# Patient Record
Sex: FEMALE | Race: WHITE | URBAN - METROPOLITAN AREA
[De-identification: names, ages, dates, MRNs, and addresses within clinical notes are randomized per-mention and may not be internally consistent; named-entity substitution may affect disease eponyms.]

---

## 2023-01-01 ENCOUNTER — NURSING HOME VISIT (OUTPATIENT)
Dept: POST ACUTE CARE | Facility: EXTERNAL LOCATION | Age: 88
End: 2023-01-01
Payer: MEDICARE

## 2023-01-01 VITALS
WEIGHT: 287 LBS | DIASTOLIC BLOOD PRESSURE: 50 MMHG | HEART RATE: 85 BPM | TEMPERATURE: 96.9 F | OXYGEN SATURATION: 98 % | BODY MASS INDEX: 54.19 KG/M2 | RESPIRATION RATE: 18 BRPM | SYSTOLIC BLOOD PRESSURE: 136 MMHG | HEIGHT: 61 IN

## 2023-01-01 VITALS
WEIGHT: 287.6 LBS | HEART RATE: 86 BPM | SYSTOLIC BLOOD PRESSURE: 131 MMHG | BODY MASS INDEX: 54.3 KG/M2 | TEMPERATURE: 97.5 F | OXYGEN SATURATION: 96 % | DIASTOLIC BLOOD PRESSURE: 58 MMHG | HEIGHT: 61 IN | RESPIRATION RATE: 16 BRPM

## 2023-01-01 VITALS
SYSTOLIC BLOOD PRESSURE: 138 MMHG | RESPIRATION RATE: 18 BRPM | DIASTOLIC BLOOD PRESSURE: 55 MMHG | TEMPERATURE: 97.3 F | OXYGEN SATURATION: 95 % | WEIGHT: 288 LBS | HEIGHT: 61 IN | BODY MASS INDEX: 54.37 KG/M2 | HEART RATE: 79 BPM

## 2023-01-01 VITALS
HEIGHT: 61 IN | WEIGHT: 287 LBS | RESPIRATION RATE: 16 BRPM | TEMPERATURE: 97.1 F | OXYGEN SATURATION: 96 % | SYSTOLIC BLOOD PRESSURE: 154 MMHG | DIASTOLIC BLOOD PRESSURE: 69 MMHG | BODY MASS INDEX: 54.19 KG/M2 | HEART RATE: 72 BPM

## 2023-01-01 DIAGNOSIS — Z74.09 IMPAIRED FUNCTIONAL MOBILITY, BALANCE, GAIT, AND ENDURANCE: ICD-10-CM

## 2023-01-01 DIAGNOSIS — N32.81 OVERACTIVE BLADDER: ICD-10-CM

## 2023-01-01 DIAGNOSIS — M19.90 OSTEOARTHRITIS, CHRONIC: ICD-10-CM

## 2023-01-01 DIAGNOSIS — F32.A ANXIETY AND DEPRESSION: ICD-10-CM

## 2023-01-01 DIAGNOSIS — E78.5 HYPERLIPIDEMIA, UNSPECIFIED HYPERLIPIDEMIA TYPE: ICD-10-CM

## 2023-01-01 DIAGNOSIS — R60.0 EDEMA OF BOTH LOWER LEGS: ICD-10-CM

## 2023-01-01 DIAGNOSIS — Z74.09 IMPAIRED FUNCTIONAL MOBILITY, BALANCE, GAIT, AND ENDURANCE: Primary | ICD-10-CM

## 2023-01-01 DIAGNOSIS — I48.11 LONGSTANDING PERSISTENT ATRIAL FIBRILLATION (MULTI): ICD-10-CM

## 2023-01-01 DIAGNOSIS — I48.91 ATRIAL FIBRILLATION, UNSPECIFIED TYPE (MULTI): ICD-10-CM

## 2023-01-01 DIAGNOSIS — R60.0 EDEMA OF BOTH LOWER LEGS: Primary | ICD-10-CM

## 2023-01-01 DIAGNOSIS — N17.9 AKI (ACUTE KIDNEY INJURY) (CMS-HCC): Primary | ICD-10-CM

## 2023-01-01 DIAGNOSIS — Z79.4 TYPE 2 DIABETES MELLITUS WITH OTHER CIRCULATORY COMPLICATION, WITH LONG-TERM CURRENT USE OF INSULIN (MULTI): ICD-10-CM

## 2023-01-01 DIAGNOSIS — E11.59 TYPE 2 DIABETES MELLITUS WITH OTHER CIRCULATORY COMPLICATION, WITH LONG-TERM CURRENT USE OF INSULIN (MULTI): ICD-10-CM

## 2023-01-01 DIAGNOSIS — Z79.01 CURRENT USE OF ANTICOAGULANT THERAPY: ICD-10-CM

## 2023-01-01 DIAGNOSIS — F41.9 ANXIETY AND DEPRESSION: ICD-10-CM

## 2023-01-01 DIAGNOSIS — F41.9 ANXIETY: Primary | ICD-10-CM

## 2023-01-01 DIAGNOSIS — K21.9 GASTROESOPHAGEAL REFLUX DISEASE, UNSPECIFIED WHETHER ESOPHAGITIS PRESENT: ICD-10-CM

## 2023-01-01 DIAGNOSIS — G25.81 RESTLESS LEG SYNDROME: ICD-10-CM

## 2023-01-01 DIAGNOSIS — R63.0 POOR APPETITE: ICD-10-CM

## 2023-01-01 LAB
ALANINE AMINOTRANSFERASE (SGPT) (U/L) IN SER/PLAS: 41 U/L (ref 7–45)
ALBUMIN (G/DL) IN SER/PLAS: 2.4 G/DL (ref 3.4–5)
ALKALINE PHOSPHATASE (U/L) IN SER/PLAS: 109 U/L (ref 33–136)
ANION GAP IN SER/PLAS: 14 MMOL/L (ref 10–20)
ANION GAP IN SER/PLAS: 20 MMOL/L (ref 10–20)
ANION GAP IN SER/PLAS: 22 MMOL/L (ref 10–20)
ASPARTATE AMINOTRANSFERASE (SGOT) (U/L) IN SER/PLAS: 63 U/L (ref 9–39)
BASOPHILS (10*3/UL) IN BLOOD BY AUTOMATED COUNT: 0.03 X10E9/L (ref 0–0.1)
BASOPHILS/100 LEUKOCYTES IN BLOOD BY AUTOMATED COUNT: 0.3 % (ref 0–2)
BILIRUBIN TOTAL (MG/DL) IN SER/PLAS: 0.7 MG/DL (ref 0–1.2)
BLOOD CULTURE: ABNORMAL
BLOOD CULTURE: ABNORMAL
CALCIUM (MG/DL) IN SER/PLAS: 8 MG/DL (ref 8.6–10.3)
CALCIUM (MG/DL) IN SER/PLAS: 8.6 MG/DL (ref 8.6–10.3)
CALCIUM (MG/DL) IN SER/PLAS: 8.8 MG/DL (ref 8.6–10.3)
CARBON DIOXIDE, TOTAL (MMOL/L) IN SER/PLAS: 18 MMOL/L (ref 21–32)
CARBON DIOXIDE, TOTAL (MMOL/L) IN SER/PLAS: 20 MMOL/L (ref 21–32)
CARBON DIOXIDE, TOTAL (MMOL/L) IN SER/PLAS: 26 MMOL/L (ref 21–32)
CHLORIDE (MMOL/L) IN SER/PLAS: 92 MMOL/L (ref 98–107)
CHLORIDE (MMOL/L) IN SER/PLAS: 96 MMOL/L (ref 98–107)
CHLORIDE (MMOL/L) IN SER/PLAS: 99 MMOL/L (ref 98–107)
CREATININE (MG/DL) IN SER/PLAS: 1.85 MG/DL (ref 0.5–1.05)
CREATININE (MG/DL) IN SER/PLAS: 2.92 MG/DL (ref 0.5–1.05)
CREATININE (MG/DL) IN SER/PLAS: 3.55 MG/DL (ref 0.5–1.05)
EOSINOPHILS (10*3/UL) IN BLOOD BY AUTOMATED COUNT: 0.05 X10E9/L (ref 0–0.4)
EOSINOPHILS/100 LEUKOCYTES IN BLOOD BY AUTOMATED COUNT: 0.5 % (ref 0–6)
ERYTHROCYTE DISTRIBUTION WIDTH (RATIO) BY AUTOMATED COUNT: 15.2 % (ref 11.5–14.5)
ERYTHROCYTE DISTRIBUTION WIDTH (RATIO) BY AUTOMATED COUNT: 17 % (ref 11.5–14.5)
ERYTHROCYTE MEAN CORPUSCULAR HEMOGLOBIN CONCENTRATION (G/DL) BY AUTOMATED: 29.8 G/DL (ref 32–36)
ERYTHROCYTE MEAN CORPUSCULAR HEMOGLOBIN CONCENTRATION (G/DL) BY AUTOMATED: 30.8 G/DL (ref 32–36)
ERYTHROCYTE MEAN CORPUSCULAR VOLUME (FL) BY AUTOMATED COUNT: 87 FL (ref 80–100)
ERYTHROCYTE MEAN CORPUSCULAR VOLUME (FL) BY AUTOMATED COUNT: 89 FL (ref 80–100)
ERYTHROCYTES (10*6/UL) IN BLOOD BY AUTOMATED COUNT: 4.2 X10E12/L (ref 4–5.2)
ERYTHROCYTES (10*6/UL) IN BLOOD BY AUTOMATED COUNT: 4.6 X10E12/L (ref 4–5.2)
GFR FEMALE: 11 ML/MIN/1.73M2
GFR FEMALE: 14 ML/MIN/1.73M2
GFR FEMALE: 25 ML/MIN/1.73M2
GLUCOSE (MG/DL) IN SER/PLAS: 196 MG/DL (ref 74–99)
GLUCOSE (MG/DL) IN SER/PLAS: 215 MG/DL (ref 74–99)
GLUCOSE (MG/DL) IN SER/PLAS: 316 MG/DL (ref 74–99)
HEMATOCRIT (%) IN BLOOD BY AUTOMATED COUNT: 36.7 % (ref 36–46)
HEMATOCRIT (%) IN BLOOD BY AUTOMATED COUNT: 40.9 % (ref 36–46)
HEMOGLOBIN (G/DL) IN BLOOD: 11.3 G/DL (ref 12–16)
HEMOGLOBIN (G/DL) IN BLOOD: 12.2 G/DL (ref 12–16)
IMMATURE GRANULOCYTES/100 LEUKOCYTES IN BLOOD BY AUTOMATED COUNT: 1.2 % (ref 0–0.9)
IMMATURE GRANULOCYTES/100 LEUKOCYTES IN BLOOD BY AUTOMATED COUNT: 1.7 % (ref 0–0.9)
LACTATE (MMOL/L) IN SER/PLAS: 2.5 MMOL/L (ref 0.4–2)
LEUKOCYTES (10*3/UL) IN BLOOD BY AUTOMATED COUNT: 11 X10E9/L (ref 4.4–11.3)
LEUKOCYTES (10*3/UL) IN BLOOD BY AUTOMATED COUNT: 15.7 X10E9/L (ref 4.4–11.3)
LYMPHOCYTES (10*3/UL) IN BLOOD BY AUTOMATED COUNT: 0.81 X10E9/L (ref 0.8–3)
LYMPHOCYTES/100 LEUKOCYTES IN BLOOD BY AUTOMATED COUNT: 7.4 % (ref 13–44)
MANUAL DIFFERENTIAL Y/N: ABNORMAL
MONOCYTES (10*3/UL) IN BLOOD BY AUTOMATED COUNT: 0.83 X10E9/L (ref 0.05–0.8)
MONOCYTES/100 LEUKOCYTES IN BLOOD BY AUTOMATED COUNT: 7.5 % (ref 2–10)
NEUTROPHILS (10*3/UL) IN BLOOD BY AUTOMATED COUNT: 9.16 X10E9/L (ref 1.6–5.5)
NEUTROPHILS/100 LEUKOCYTES IN BLOOD BY AUTOMATED COUNT: 83.1 % (ref 40–80)
NRBC (PER 100 WBCS) BY AUTOMATED COUNT: 0 /100 WBC (ref 0–0)
NRBC (PER 100 WBCS) BY AUTOMATED COUNT: 0.3 /100 WBC (ref 0–0)
PLATELETS (10*3/UL) IN BLOOD AUTOMATED COUNT: 159 X10E9/L (ref 150–450)
PLATELETS (10*3/UL) IN BLOOD AUTOMATED COUNT: 209 X10E9/L (ref 150–450)
POTASSIUM (MMOL/L) IN SER/PLAS: 3.7 MMOL/L (ref 3.5–5.3)
POTASSIUM (MMOL/L) IN SER/PLAS: 4.7 MMOL/L (ref 3.5–5.3)
POTASSIUM (MMOL/L) IN SER/PLAS: 4.8 MMOL/L (ref 3.5–5.3)
PROTEIN TOTAL: 5 G/DL (ref 6.4–8.2)
SODIUM (MMOL/L) IN SER/PLAS: 129 MMOL/L (ref 136–145)
SODIUM (MMOL/L) IN SER/PLAS: 129 MMOL/L (ref 136–145)
SODIUM (MMOL/L) IN SER/PLAS: 135 MMOL/L (ref 136–145)
UREA NITROGEN (MG/DL) IN SER/PLAS: 50 MG/DL (ref 6–23)
UREA NITROGEN (MG/DL) IN SER/PLAS: 69 MG/DL (ref 6–23)
UREA NITROGEN (MG/DL) IN SER/PLAS: 88 MG/DL (ref 6–23)

## 2023-01-01 PROCEDURE — 99309 SBSQ NF CARE MODERATE MDM 30: CPT | Performed by: PHYSICIAN ASSISTANT

## 2023-01-01 PROCEDURE — 99309 SBSQ NF CARE MODERATE MDM 30: CPT | Performed by: NURSE PRACTITIONER

## 2023-01-01 PROCEDURE — 99310 SBSQ NF CARE HIGH MDM 45: CPT | Performed by: PHYSICIAN ASSISTANT

## 2023-01-01 RX ORDER — ATORVASTATIN CALCIUM 20 MG/1
1 TABLET, FILM COATED ORAL DAILY
COMMUNITY
Start: 2019-09-20

## 2023-01-01 RX ORDER — OMEPRAZOLE 20 MG/1
20 CAPSULE, DELAYED RELEASE ORAL
COMMUNITY
Start: 2019-09-20

## 2023-01-01 RX ORDER — ACETAMINOPHEN 325 MG/1
2 TABLET ORAL EVERY 6 HOURS PRN
COMMUNITY
Start: 2020-12-04

## 2023-01-01 RX ORDER — MONTELUKAST SODIUM 10 MG/1
1 TABLET ORAL DAILY
COMMUNITY
Start: 2019-09-18

## 2023-01-01 RX ORDER — METOPROLOL TARTRATE 25 MG/1
1 TABLET, FILM COATED ORAL 2 TIMES DAILY
COMMUNITY
Start: 2020-12-04

## 2023-01-01 RX ORDER — ROPINIROLE 2 MG/1
1 TABLET, FILM COATED ORAL NIGHTLY
COMMUNITY
Start: 2022-04-11

## 2023-01-01 RX ORDER — ASCORBIC ACID 500 MG
1 TABLET ORAL DAILY
COMMUNITY
Start: 2020-01-11

## 2023-01-01 RX ORDER — ADHESIVE BANDAGE
30 BANDAGE TOPICAL DAILY PRN
COMMUNITY
Start: 2019-09-18

## 2023-01-01 RX ORDER — METHENAMINE HIPPURATE 1000 MG/1
1 TABLET ORAL 2 TIMES DAILY
COMMUNITY
Start: 2019-09-20

## 2023-01-01 RX ORDER — EPINEPHRINE 0.3 MG/.3ML
1 INJECTION SUBCUTANEOUS AS NEEDED
COMMUNITY
Start: 2020-10-12

## 2023-01-01 RX ORDER — LINAGLIPTIN 5 MG/1
1 TABLET, FILM COATED ORAL DAILY
COMMUNITY
Start: 2019-09-20

## 2023-01-01 RX ORDER — CALCIUM CARBONATE/VITAMIN D3 600MG-5MCG
1 TABLET ORAL 2 TIMES DAILY
COMMUNITY
Start: 2020-02-18

## 2023-01-01 RX ORDER — MIRABEGRON 25 MG/1
25 TABLET, FILM COATED, EXTENDED RELEASE ORAL DAILY
COMMUNITY
Start: 2019-09-18

## 2023-01-01 RX ORDER — INSULIN GLARGINE 100 [IU]/ML
15 INJECTION, SOLUTION SUBCUTANEOUS NIGHTLY
COMMUNITY
Start: 2019-09-20

## 2023-01-01 RX ORDER — DEXTROSE 40 %
15 GEL (GRAM) ORAL DAILY PRN
COMMUNITY
Start: 2019-09-18

## 2023-01-01 RX ORDER — FUROSEMIDE 40 MG/1
1 TABLET ORAL 2 TIMES DAILY
COMMUNITY
Start: 2019-09-20

## 2023-01-01 RX ORDER — DEXTROMETHORPHAN HYDROBROMIDE, GUAIFENESIN 5; 100 MG/5ML; MG/5ML
650 LIQUID ORAL NIGHTLY
COMMUNITY
Start: 2019-09-18

## 2023-01-01 RX ORDER — NYSTATIN 100000 [USP'U]/G
1 POWDER TOPICAL 2 TIMES DAILY PRN
COMMUNITY
Start: 2019-12-21

## 2023-01-01 ASSESSMENT — ENCOUNTER SYMPTOMS
NERVOUS/ANXIOUS: 1
JOINT SWELLING: 0
EXTREMITY WEAKNESS: 1
CHILLS: 0
CONFUSION: 0
FEELING OF CHOKING: 0
MUSCLE TENSION: 0
FEVER: 0
DEPRESSED MOOD: 1
SHORTNESS OF BREATH: 0
IRRITABILITY: 0
FATIGUE: 1

## 2023-03-10 PROBLEM — N32.81 OVERACTIVE BLADDER: Status: ACTIVE | Noted: 2023-01-01

## 2023-03-10 PROBLEM — G25.81 RESTLESS LEG SYNDROME: Status: ACTIVE | Noted: 2023-01-01

## 2023-03-10 PROBLEM — I10 HYPERTENSION: Status: ACTIVE | Noted: 2023-01-01

## 2023-03-10 PROBLEM — R60.0 EDEMA OF BOTH LOWER LEGS: Status: ACTIVE | Noted: 2023-01-01

## 2023-03-10 PROBLEM — K21.9 GERD (GASTROESOPHAGEAL REFLUX DISEASE): Status: ACTIVE | Noted: 2023-01-01

## 2023-03-10 PROBLEM — E78.5 HYPERLIPIDEMIA: Status: ACTIVE | Noted: 2023-01-01

## 2023-03-10 PROBLEM — I48.91 ATRIAL FIBRILLATION (MULTI): Status: ACTIVE | Noted: 2023-01-01

## 2023-03-10 PROBLEM — E11.9 DIABETES MELLITUS (MULTI): Status: ACTIVE | Noted: 2023-01-01

## 2023-03-10 PROBLEM — J44.9 COPD (CHRONIC OBSTRUCTIVE PULMONARY DISEASE) (MULTI): Status: ACTIVE | Noted: 2023-01-01

## 2023-03-10 PROBLEM — M19.90 OSTEOARTHRITIS, CHRONIC: Status: ACTIVE | Noted: 2023-01-01

## 2023-03-10 PROBLEM — I73.9 PVD (PERIPHERAL VASCULAR DISEASE) (CMS-HCC): Status: ACTIVE | Noted: 2023-01-01

## 2023-03-10 NOTE — ASSESSMENT & PLAN NOTE
-patient continues to refuse ace wrap and emil hose  -encourage resident to elevate legs  -continue lasix bid

## 2023-03-10 NOTE — ASSESSMENT & PLAN NOTE
-continue basaglar at bedtime as ordered  -Continue tradjenta daily  -Continue sliding scale as ordered prn  -Blood sugars at baseline with occasional elevations, but no consistent pattern  -Monitor HGA1C level every 6 months

## 2023-03-10 NOTE — PROGRESS NOTES
Patient was arrived in error. Please go to pradeep             function.  CALCIUM 9.0 8.4-10.2 mg/dL  CBC W/O DIFF STAR  WBC 7.6 4.5-10.8 K/cmm  RBC 4.10 3.90-5.40 M/cmm  ~ HEMOGLOBIN 11.4 L 12.0-16.0 g/dL  ~ HEMATOCRIT 35.7 L 36.0-48.0 %  MCV 87.1 80.0-100.0 fL  MCH 27.8 26.0-35.0 pg  MCHC 31.9 31.0-36.5 g/dL  ~ RDW 16.9 H 11.0-16.0 %  PLATELET 198 150-450 K/cmm  MPV 9.4 6.5-12.0 fL        Assessment/Plan   Problem List Items Addressed This Visit          Nervous    Restless leg syndrome     -continue ropinerole at bedtime  -monitor for increased s/s            Digestive    GERD (gastroesophageal reflux disease)     -continue omeprazole daily  -Monitor for increased s/s            Musculoskeletal    Osteoarthritis, chronic     -tylenol prn         Edema of both lower legs - Primary     -patient continues to refuse ace wrap and emil hose  -encourage resident to elevate legs  -continue lasix bid            Endocrine/Metabolic    Diabetes mellitus (CMS/HCC)     -continue basaglar at bedtime as ordered  -Continue tradjenta daily  -Continue sliding scale as ordered prn  -Blood sugars at baseline with occasional elevations, but no consistent pattern  -Monitor HGA1C level every 6 months            Other    Hyperlipidemia     -continue atorvastatin every day  -monitor lipid profile and lft q6months

## 2023-03-10 NOTE — LETTER
"Subjective   Patient ID: Katya Mejia is a 89 y.o. female who presents for follow up of bilatral lower leg edema.  .89 year old female LTC Resident with hx of COPD, DM2, Afib, PVD and HTN. Per Nursing, Resident is stable and continues to walk better per nursing. Resident sitting up in her chair,, watching tv after lunch. Nursing reports resident has been c/o increased restlessness to both legs especially worse at night. Taking tylenol but reports \"it doesn't help.\" Resident will often refuse to allow the nurses to wrap her legs with the ace wraps. Sitting in her recliner with legs partially elevated. Encouraged her to elevate legs as high as she can tolerate to promote vascular return. . See ROS below. Full code.     Resident seen for follow up of bilateral lower extremity edema. Sitting in her recliner with her legs down, encourged her, once again to lift her legs while up in her chair. Continues to refuse ace wraps or emil hose to her legs. Denies all other concerns.           Review of Systems   Constitutional: Negative.  Negative for appetite change and fatigue.   HENT: Negative.  Negative for sinus pain and sore throat.    Eyes: Negative.    Respiratory: Negative.  Negative for apnea, cough, chest tightness and shortness of breath.    Cardiovascular:  Positive for leg swelling. Negative for chest pain.   Gastrointestinal:  Negative for abdominal distention and constipation.   Endocrine: Negative.    Genitourinary: Negative.  Negative for difficulty urinating and dysuria.   Musculoskeletal: Negative.    Skin: Negative.    Hematological: Negative.    Psychiatric/Behavioral:  Negative for confusion. The patient is not nervous/anxious.    All other systems reviewed and are negative.              Objective   97.9 P-76 R19 /72 wt::288.4lbs po2:97% RA  Physical Exam  Vitals and nursing note reviewed.   Constitutional:       Appearance: Normal appearance. She is normal weight.   HENT:      Head: Normocephalic. "      Nose: Nose normal.      Mouth/Throat:      Mouth: Mucous membranes are dry.   Eyes:      Extraocular Movements: Extraocular movements intact.      Pupils: Pupils are equal, round, and reactive to light.   Cardiovascular:      Rate and Rhythm: Normal rate and regular rhythm.      Pulses: Normal pulses.      Heart sounds: Normal heart sounds.   Pulmonary:      Effort: Pulmonary effort is normal.      Breath sounds: Normal breath sounds.   Abdominal:      General: Abdomen is flat. Bowel sounds are normal.      Palpations: Abdomen is soft.   Musculoskeletal:         General: Normal range of motion.      Cervical back: Normal range of motion and neck supple.   Skin:     General: Skin is warm and dry.      Capillary Refill: Capillary refill takes less than 2 seconds.   Neurological:      General: No focal deficit present.      Mental Status: She is alert and oriented to person, place, and time. Mental status is at baseline.   Psychiatric:         Mood and Affect: Mood normal.         Behavior: Behavior normal.         Thought Content: Thought content normal.         Judgment: Judgment normal.      LABS:  3/8/23:  BASIC MET PNL INCL GFR (BMP) STAR  ~ GLUCOSE 139 H  GLUCOSE, FASTING 65-99 mg/dL  GLUCOSE, NON-FASTING  mg/dL  SODIUM 143 136-145 mEq/L  POTASSIUM 3.9 3.5-5.3 mEq/L  CHLORIDE 102  mEq/L  CARBON DIOXIDE (CO2) 27 21-33 mEq/L  ~ BUN (UREA NITROGEN) 45 H 7-25 mg/dL  ~ CREATININE 1.5 H 0.6-1.2 mg/dL  ~ BUN/CREATININE RATIO 30 H 6-22  ~ GFR-AFRICAN AMERICAN 39 L >60 mL/min/1.73 m2  ~ GFR-NON-AFRICAN AMERICAN 32 L >60 mL/min/1.73 m2   Stage of CKD eGFR (mL/min/1.73 square meters)   Stage 1 >/= 90 or > 90   Stage 2 60 - 89   Stage 3 30 - 59   Stage 4 15 - 29   Stage 5 </= 14 or < 15  ** GFR is reliable for adults 17 to 69 years with stable kidney   function.  CALCIUM 9.0 8.4-10.2 mg/dL  CBC W/O DIFF STAR  WBC 7.6 4.5-10.8 K/cmm  RBC 4.10 3.90-5.40 M/cmm  ~ HEMOGLOBIN 11.4 L 12.0-16.0 g/dL  ~ HEMATOCRIT  35.7 L 36.0-48.0 %  MCV 87.1 80.0-100.0 fL  MCH 27.8 26.0-35.0 pg  MCHC 31.9 31.0-36.5 g/dL  ~ RDW 16.9 H 11.0-16.0 %  PLATELET 198 150-450 K/cmm  MPV 9.4 6.5-12.0 fL                 Assessment/Plan   Problem List Items Addressed This Visit                  Nervous     Restless leg syndrome       -continue ropinerole at bedtime  -monitor for increased s/s                Digestive     GERD (gastroesophageal reflux disease)       -continue omeprazole daily  -Monitor for increased s/s                Musculoskeletal     Osteoarthritis, chronic       -tylenol prn           Edema of both lower legs - Primary       -patient continues to refuse ace wrap and emil hose  -encourage resident to elevate legs  -continue lasix bid                Endocrine/Metabolic     Diabetes mellitus (CMS/HCC)       -continue basaglar at bedtime as ordered  -Continue tradjenta daily  -Continue sliding scale as ordered prn  -Blood sugars at baseline with occasional elevations, but no consistent pattern  -Monitor HGA1C level every 6 months                Other     Hyperlipidemia       -continue atorvastatin every day  -monitor lipid profile and lft q6months

## 2023-05-31 NOTE — PROGRESS NOTES
"5/30/2023  Name: Katya Mejia  YOB: 1929    Chief complaint: Impaired mobility. Dependent on nursing staff for ADL's.    HPI: This is a 89 year old  female who has a medical history remarkable for venous insufficiency, gerd, overactive bladder, diabetes, stage 3 CKD, atrial fibrillation, long term use of anticoagulation, hypertension, restless leg syndrome, AGUSTO. She is a long term resident at [facility]. Patient recently treated for UTI. She is able to stand with assistance but is not able to walk. Review of chart shows good blood sugar control on current regimen. Seen and examined in her room. Patient offers no new complaint.s    Extremity Weakness  The current episode started more than 1 year ago. The problem occurs daily. The problem has been unchanged. Associated symptoms include fatigue. Pertinent negatives include no chest pain, chills, fever or joint swelling. The symptoms are aggravated by standing and exertion. She has tried rest, position changes and acetaminophen for the symptoms. The treatment provided mild relief.     Review of systems:   ROS negative except were noted in HPI.    Code Status: DNR-CCA    /55   Pulse 79   Temp 36.3 °C (97.3 °F)   Resp 18   Ht 1.549 m (5' 1\")   Wt 131 kg (288 lb)   SpO2 95%   BMI 54.42 kg/m²      Physical Exam  Constitutional:       General: She is not in acute distress.     Appearance: She is obese. She is not toxic-appearing.   HENT:      Head: Normocephalic.      Nose: Nose normal. No congestion.      Mouth/Throat:      Pharynx: Oropharynx is clear.   Eyes:      Extraocular Movements: Extraocular movements intact.      Pupils: Pupils are equal, round, and reactive to light.   Cardiovascular:      Rate and Rhythm: Normal rate. Rhythm irregular.      Pulses: Normal pulses.   Pulmonary:      Effort: Pulmonary effort is normal.      Breath sounds: Normal breath sounds. No wheezing or rales.   Abdominal:      General: There is no " distension.      Palpations: Abdomen is soft.      Tenderness: There is no abdominal tenderness. There is no guarding.   Genitourinary:     Comments: Incontinent of urine    Musculoskeletal:      Cervical back: Normal range of motion.      Right lower leg: Edema present.      Left lower leg: Edema present.      Comments: Not walking.   Lymphadenopathy:      Cervical: No cervical adenopathy.   Skin:     General: Skin is warm and dry.      Capillary Refill: Capillary refill takes less than 2 seconds.   Neurological:      General: No focal deficit present.      Mental Status: She is alert. Mental status is at baseline.   Psychiatric:         Mood and Affect: Mood normal.         Behavior: Behavior normal.        Medications reviewed during visit at facility.  Omeprazole 20 mg po daily  Ammonium lactate 12% cream to legs bid  Myrbetriq 25 mg po daily\  Atorvastatin 20 mg po daily  Colace 100 mg po bid  Metoprolol tartrate 25 mg po bid  Calcium/Vitamin D 600/200 po daily  Basaglar insulin 15 units subcutaneous daily  Nystatin powder 100,000 to abdominal folds q 4 hours prn  Ropinirole 2 mg po q hs.    Loratadine 10 mg po daily  Magnesium oxide 400 mg po daily  Lasix 40 mg po daily  Vitamin C 500 mg po daily  Novolog insulin sliding scale.  Tradjenta 5 mg po daily  Eliquis 2.5 po bid    Labs reviewed at facility:    Laboratory: 5/26/2023 15:09 BASIC MET PNL INCL GFR (BMP) / CBC W/DIFF  Latest Version (more available)  Reviewed by lara on 5/30/2023 08:46  Resident Information  Resident: Katya Mejia ([id number])  Admit Date: 12/3/2020  Admitting Provider:   Attending Provider:   Copy to List:   Report Information  Collection Date: 5/26/2023 07:30  Received Date: 5/26/2023 07:57  Reported Date: 5/26/2023 15:09  Ord. Provider: Luz Garrett  Source Key: [id number]  Lab Information  Status: Completed  Flag:    Reporting Lab:   [facility]  Order #:   [id number]  Vendor Order #:   [id number]  Category:   Chemistry,  Hematology, Unknown Category  Order Notes  Result for:  LEANDRO STUBBS  ( 1929, F)         Results   Show All Details Result Units Ref. Range Flag Status   BASIC MET PNL INCL GFR (BMP)       GLUCOSE  119 mg/dL  H Final      GLUCOSE, FASTING         65-99  mg/dL                               GLUCOSE, NON-FASTING      mg/dL       SODIUM  144 mEq/L 136-145  Final           POTASSIUM  4.1 mEq/L 3.5-5.3  Final           CHLORIDE  109 mEq/L   Final           CARBON DIOXIDE (CO2)  20 mEq/L 21-33 L Final           BUN (UREA NITROGEN)  16 mg/dL 7-25  Final           CREATININE  1.4 mg/dL 0.6-1.2 H Final           BUN/CREATININE RATIO  11  6-22  Final           GFR-  42 mL/min/1.73 m2 >60 L Final           GFR-NON-AFRICAN AMERICAN  35 mL/min/1.73 m2 >60 L Final           Stage of CKD     eGFR (mL/min/1.73 square meters)          Stage 1        >/= 90        or > 90          Stage 2        60 - 89          Stage 3        30 - 59          Stage 4        15 - 29          Stage 5        </= 14        or < 15  ** GFR is reliable for adults 17 to 69 years with stable kidney      function.        CALCIUM  8.3 mg/dL 8.4-10.2 L Final       CBC W/DIFF       WBC  7.6 K/cmm 4.5-10.8  Final           RBC  3.63 M/cmm 3.90-5.40 L Final           HEMOGLOBIN  10.1 g/dL 12.0-16.0 L Final           HEMATOCRIT  31.8 % 36.0-48.0 L Final           MCV  87.6 fL 80.0-100.0  Final           MCH  27.8 pg 26.0-35.0  Final           MCHC  31.7 g/dL 31.0-36.5  Final           RDW  17.3 % 11.0-16.0 H Final           PLATELET  222 K/cmm 150-450  Final           MPV  8.0 fL 6.5-12.0  Final           ANISOCYTOSIS  1+  NEGATIVE A Final           NEUTROPHILS  71.3 % 40.0-80.0  Final           LYMPHS  16.4 % 13.0-48.0  Final           MONOCYTES  5.7 % 2.0-12.0  Final           EOS  6.1 % 0.0-8.0  Final           BASO  0.5 % 0.0-2.0  Final           NEUTS (ABSOLUTE)  5.40 K/uL 1.50-7.60  Final           LYMPHS  (ABSOLUTE)  1.30 K/uL 0.90-5.50  Final           MONOCYTES (ABSOLUTE)  0.40 K/uL 0.15-1.10  Final           EOS (ABSOLUTE)  0.50 K/uL 0.20-0.80  Final           NUCLEATED RBC  0.1 NRBC/100 WBC <1.0  Final        Assessment/Plan    Problem List Items Addressed This Visit       Atrial fibrillation (CMS/HCC)     Controlled rate on beta blocker. Continue Metoprolol 25 mg po bid.         Overactive bladder     Myrbetriq 25 mg po daily.         Edema of both lower legs     Review labs. Continue with Lasix 40 mg po daily.         Impaired functional mobility, balance, gait, and endurance - Primary     PT and OT to assess and treat. Discuss progress with physical therapy.         Current use of anticoagulant therapy     Eliquis 2.5 po bid            Time:  I spent 45 minutes or greater with the patient. Greater than 50% of this time was spent in counseling and or coordination of care. The time includes prep time of reviewing vital signs, report from direct nursing staff and or therapists, hospital documentation, reviewing labs, radiographs, diagnostic tests and or consultations, time directly spent with the patient interviewing, examining, and education regarding diagnosis, treatments, and medications, as well as documentation in the electronic medical record, and reviewing the plan of care and any new orders with the patient, nursing staff and other staff directly related to the patients care.      Luiz Galarza PA-C

## 2023-06-02 PROBLEM — Z79.01 CURRENT USE OF ANTICOAGULANT THERAPY: Status: ACTIVE | Noted: 2023-01-01

## 2023-06-02 PROBLEM — Z74.09 IMPAIRED FUNCTIONAL MOBILITY, BALANCE, GAIT, AND ENDURANCE: Status: ACTIVE | Noted: 2023-01-01

## 2023-06-08 NOTE — PROGRESS NOTES
"6/8/2023  Name: Katya Mejia  YOB: 1929    Chief complaint: Anxiety. Fear of transfers during physical therapy sessions.    HPI: Patient is dependent on nursing staff for repositioning, transfers, and toileting. She becomes anxious when asked to participate in transfers or standing with [place] lift. Patient had been previously walking with walker for safety. Patient is morbidly obese.    Anxiety  Presents for initial visit. Onset was in the past 7 days. The problem has been unchanged. Symptoms include depressed mood, excessive worry and nervous/anxious behavior. Patient reports no chest pain, confusion, feeling of choking, irritability, muscle tension or shortness of breath. Symptoms occur most days. The severity of symptoms is interfering with daily activities. Exacerbated by: physical activity. The quality of sleep is fair. Nighttime awakenings: none.     There are no known risk factors. Her past medical history is significant for depression. Past treatments include nothing. The treatment provided no relief. Compliance with prior treatments has been good.       Review of systems:   ROS negative except were noted in HPI.    Code Status: DNR-CCA    /50   Pulse 85   Temp 36.1 °C (96.9 °F)   Resp 18   Ht 1.549 m (5' 1\")   Wt 130 kg (287 lb)   SpO2 98%   BMI 54.23 kg/m²      Physical Exam  Constitutional:       General: She is not in acute distress.     Appearance: She is obese. She is not toxic-appearing.   HENT:      Head: Normocephalic.      Nose: Nose normal. No congestion.      Mouth/Throat:      Pharynx: Oropharynx is clear.   Eyes:      Extraocular Movements: Extraocular movements intact.      Pupils: Pupils are equal, round, and reactive to light.   Cardiovascular:      Rate and Rhythm: Normal rate. Rhythm irregular.      Pulses: Normal pulses.   Pulmonary:      Effort: Pulmonary effort is normal.      Breath sounds: Normal breath sounds. No wheezing or rales.   Abdominal:      " General: There is no distension.      Palpations: Abdomen is soft.      Tenderness: There is no abdominal tenderness. There is no guarding.   Genitourinary:     Comments: Incontinent of urine     Musculoskeletal:      Cervical back: Normal range of motion.      Right lower leg: Edema present.      Left lower leg: Edema present.      Comments: Not walking.   Lymphadenopathy:      Cervical: No cervical adenopathy.   Skin:     General: Skin is warm and dry.      Capillary Refill: Capillary refill takes less than 2 seconds.   Neurological:      General: No focal deficit present.      Mental Status: She is alert. Mental status is at baseline.   Psychiatric:         Mood and Affect: Mood normal.         Behavior: Behavior normal.      Medications reviewed during visit at facility.  Omeprazole 20 mg po daily  Ammonium lactate 12% cream to legs bid  Myrbetriq 25 mg po daily\  Atorvastatin 20 mg po daily  Colace 100 mg po bid  Metoprolol tartrate 25 mg po bid  Calcium/Vitamin D 600/200 po daily  Basaglar insulin 15 units subcutaneous daily  Nystatin powder 100,000 to abdominal folds q 4 hours prn  Ropinirole 2 mg po q hs.    Loratadine 10 mg po daily  Magnesium oxide 400 mg po daily  Lasix 40 mg po daily  Vitamin C 500 mg po daily  Novolog insulin sliding scale.  Tradjenta 5 mg po daily  Eliquis 2.5 po bid  Zoloft 25 mg po daily  Labs reviewed at facility:    Laboratory: 6/5/2023 15:00 BASIC MET PNL INCL GFR (BMP) / CBC W/DIFF  Latest Version (more available)  Reviewed by lara on 6/6/2023 10:53  Resident Information  Resident: Katya Mejia ([id number] NF)  Admit Date: 12/3/2020  Admitting Provider:   Attending Provider:   Copy to List:   Report Information  Collection Date: 6/5/2023 09:10  Received Date: 6/5/2023 10:51  Reported Date: 6/5/2023 15:00  Ord. Provider: Giovanni Sparks  Source [place]: [id number]  Lab Information  Status: Completed  Flag:    Reporting Lab:   [facility]  Order #:   [id number]  Vendor Order  #:   [id number]  Category:   Chemistry, Hematology, Unknown Category  Order Notes  Result for:  LEANDRO SUTBBS  ( 1929, F)         Results   Show All Details Result Units Ref. Range Flag Status   BASIC MET PNL INCL GFR (BMP)       GLUCOSE  157 mg/dL  H Final      GLUCOSE, FASTING         65-99  mg/dL                               GLUCOSE, NON-FASTING      mg/dL       SODIUM  138 mEq/L 136-145  Final           POTASSIUM  3.6 mEq/L 3.5-5.3  Final           CHLORIDE  100 mEq/L   Final           CARBON DIOXIDE (CO2)  25 mEq/L 21-33  Final           BUN (UREA NITROGEN)  27 mg/dL 7-25 H Final           CREATININE  1.5 mg/dL 0.6-1.2 H Final           BUN/CREATININE RATIO  18  6-22  Final           GFR-  39 mL/min/1.73 m2 >60 L Final           GFR-NON-AFRICAN AMERICAN  32 mL/min/1.73 m2 >60 L Final           Stage of CKD     eGFR (mL/min/1.73 square meters)          Stage 1        >/= 90        or > 90          Stage 2        60 - 89          Stage 3        30 - 59          Stage 4        15 - 29          Stage 5        </= 14        or < 15  ** GFR is reliable for adults 17 to 69 years with stable kidney      function.        CALCIUM  8.9 mg/dL 8.4-10.2  Final       CBC W/DIFF       WBC  9.1 K/cmm 4.5-10.8  Final           RBC  3.58 M/cmm 3.90-5.40 L Final           HEMOGLOBIN  10.0 g/dL 12.0-16.0 L Final           HEMATOCRIT  31.4 % 36.0-48.0 L Final           MCV  87.8 fL 80.0-100.0  Final           MCH  28.1 pg 26.0-35.0  Final           MCHC  32.0 g/dL 31.0-36.5  Final           RDW  16.7 % 11.0-16.0 H Final           PLATELET  305 K/cmm 150-450  Final           MPV  7.7 fL 6.5-12.0  Final           ANISOCYTOSIS  1+  NEGATIVE A Final           NEUTROPHILS  74.3 % 40.0-80.0  Final           LYMPHS  13.7 % 13.0-48.0  Final           MONOCYTES  7.0 % 2.0-12.0  Final           EOS  4.4 % 0.0-8.0  Final           BASO  0.6 % 0.0-2.0  Final           NEUTS  (ABSOLUTE)  6.70 K/uL 1.50-7.60  Final           LYMPHS (ABSOLUTE)  1.20 K/uL 0.90-5.50  Final           MONOCYTES (ABSOLUTE)  0.60 K/uL 0.15-1.10  Final           EOS (ABSOLUTE)  0.40 K/uL 0.20-0.80  Final           BASO (ABSOLUTE)  0.10 K/uL 0.00-0.30  Final  Assessment/Plan    Problem List Items Addressed This Visit       Edema of both lower legs     Review labs. Continue with Lasix 40 mg po daily         Impaired functional mobility, balance, gait, and endurance     PT and OT to assess and treat. Discuss progress with physical therapy.          Current use of anticoagulant therapy     Eliquis 2.5 po bid          Anxiety - Primary     Start Zoloft 25 mg po daily.            Time:    Luiz Galarza PA-C

## 2023-06-08 NOTE — LETTER
"Patient: Katya Mejia  : 1929    Encounter Date: 2023  Name: Katya Mejia  YOB: 1929    Chief complaint: Anxiety. Fear of transfers during physical therapy sessions.    HPI: Patient is dependent on nursing staff for repositioning, transfers, and toileting. She becomes anxious when asked to participate in transfers or standing with [place] lift. Patient had been previously walking with walker for safety. Patient is morbidly obese.    Anxiety  Presents for initial visit. Onset was in the past 7 days. The problem has been unchanged. Symptoms include depressed mood, excessive worry and nervous/anxious behavior. Patient reports no chest pain, confusion, feeling of choking, irritability, muscle tension or shortness of breath. Symptoms occur most days. The severity of symptoms is interfering with daily activities. Exacerbated by: physical activity. The quality of sleep is fair. Nighttime awakenings: none.     There are no known risk factors. Her past medical history is significant for depression. Past treatments include nothing. The treatment provided no relief. Compliance with prior treatments has been good.       Review of systems:   ROS negative except were noted in HPI.    Code Status: DNR-CCA    /50   Pulse 85   Temp 36.1 °C (96.9 °F)   Resp 18   Ht 1.549 m (5' 1\")   Wt 130 kg (287 lb)   SpO2 98%   BMI 54.23 kg/m²      Physical Exam  Constitutional:       General: She is not in acute distress.     Appearance: She is obese. She is not toxic-appearing.   HENT:      Head: Normocephalic.      Nose: Nose normal. No congestion.      Mouth/Throat:      Pharynx: Oropharynx is clear.   Eyes:      Extraocular Movements: Extraocular movements intact.      Pupils: Pupils are equal, round, and reactive to light.   Cardiovascular:      Rate and Rhythm: Normal rate. Rhythm irregular.      Pulses: Normal pulses.   Pulmonary:      Effort: Pulmonary effort is normal.      " Breath sounds: Normal breath sounds. No wheezing or rales.   Abdominal:      General: There is no distension.      Palpations: Abdomen is soft.      Tenderness: There is no abdominal tenderness. There is no guarding.   Genitourinary:     Comments: Incontinent of urine     Musculoskeletal:      Cervical back: Normal range of motion.      Right lower leg: Edema present.      Left lower leg: Edema present.      Comments: Not walking.   Lymphadenopathy:      Cervical: No cervical adenopathy.   Skin:     General: Skin is warm and dry.      Capillary Refill: Capillary refill takes less than 2 seconds.   Neurological:      General: No focal deficit present.      Mental Status: She is alert. Mental status is at baseline.   Psychiatric:         Mood and Affect: Mood normal.         Behavior: Behavior normal.      Medications reviewed during visit at facility.  Omeprazole 20 mg po daily  Ammonium lactate 12% cream to legs bid  Myrbetriq 25 mg po daily\  Atorvastatin 20 mg po daily  Colace 100 mg po bid  Metoprolol tartrate 25 mg po bid  Calcium/Vitamin D 600/200 po daily  Basaglar insulin 15 units subcutaneous daily  Nystatin powder 100,000 to abdominal folds q 4 hours prn  Ropinirole 2 mg po q hs.    Loratadine 10 mg po daily  Magnesium oxide 400 mg po daily  Lasix 40 mg po daily  Vitamin C 500 mg po daily  Novolog insulin sliding scale.  Tradjenta 5 mg po daily  Eliquis 2.5 po bid  Zoloft 25 mg po daily  Labs reviewed at facility:    Laboratory: 6/5/2023 15:00 BASIC MET PNL INCL GFR (BMP) / CBC W/DIFF  Latest Version (more available)  Reviewed by lara on 6/6/2023 10:53  Resident Information  Resident: Katya Mejia ([id number] NF)  Admit Date: 12/3/2020  Admitting Provider:   Attending Provider:   Copy to List:   Report Information  Collection Date: 6/5/2023 09:10  Received Date: 6/5/2023 10:51  Reported Date: 6/5/2023 15:00  Ord. Provider: Giovanni Sparks  Source [place]: [id number]  Lab  Information  Status: Completed  Flag:    Reporting Lab:   [facility]  Order #:   [id number]  Vendor Order #:   [id number]  Category:   Chemistry, Hematology, Unknown Category  Order Notes  Result for:  LEANDRO STUBBS  ( 1929, F)         Results   Show All Details Result Units Ref. Range Flag Status   BASIC MET PNL INCL GFR (BMP)       GLUCOSE  157 mg/dL  H Final      GLUCOSE, FASTING         65-99  mg/dL                               GLUCOSE, NON-FASTING      mg/dL       SODIUM  138 mEq/L 136-145  Final           POTASSIUM  3.6 mEq/L 3.5-5.3  Final           CHLORIDE  100 mEq/L   Final           CARBON DIOXIDE (CO2)  25 mEq/L 21-33  Final           BUN (UREA NITROGEN)  27 mg/dL 7-25 H Final           CREATININE  1.5 mg/dL 0.6-1.2 H Final           BUN/CREATININE RATIO  18  6-22  Final           GFR-  39 mL/min/1.73 m2 >60 L Final           GFR-NON-AFRICAN AMERICAN  32 mL/min/1.73 m2 >60 L Final           Stage of CKD     eGFR (mL/min/1.73 square meters)          Stage 1        >/= 90        or > 90          Stage 2        60 - 89          Stage 3        30 - 59          Stage 4        15 - 29          Stage 5        </= 14        or < 15  ** GFR is reliable for adults 17 to 69 years with stable kidney      function.        CALCIUM  8.9 mg/dL 8.4-10.2  Final       CBC W/DIFF       WBC  9.1 K/cmm 4.5-10.8  Final           RBC  3.58 M/cmm 3.90-5.40 L Final           HEMOGLOBIN  10.0 g/dL 12.0-16.0 L Final           HEMATOCRIT  31.4 % 36.0-48.0 L Final           MCV  87.8 fL 80.0-100.0  Final           MCH  28.1 pg 26.0-35.0  Final           MCHC  32.0 g/dL 31.0-36.5  Final           RDW  16.7 % 11.0-16.0 H Final           PLATELET  305 K/cmm 150-450  Final           MPV  7.7 fL 6.5-12.0  Final           ANISOCYTOSIS  1+  NEGATIVE A Final           NEUTROPHILS  74.3 % 40.0-80.0  Final           LYMPHS  13.7 % 13.0-48.0  Final           MONOCYTES  7.0 % 2.0-12.0  Final            EOS  4.4 % 0.0-8.0  Final           BASO  0.6 % 0.0-2.0  Final           NEUTS (ABSOLUTE)  6.70 K/uL 1.50-7.60  Final           LYMPHS (ABSOLUTE)  1.20 K/uL 0.90-5.50  Final           MONOCYTES (ABSOLUTE)  0.60 K/uL 0.15-1.10  Final           EOS (ABSOLUTE)  0.40 K/uL 0.20-0.80  Final           BASO (ABSOLUTE)  0.10 K/uL 0.00-0.30  Final  Assessment/Plan   Problem List Items Addressed This Visit       Edema of both lower legs     Review labs. Continue with Lasix 40 mg po daily         Impaired functional mobility, balance, gait, and endurance     PT and OT to assess and treat. Discuss progress with physical therapy.          Current use of anticoagulant therapy     Eliquis 2.5 po bid          Anxiety - Primary     Start Zoloft 25 mg po daily.            Time:    Luiz Galarza PA-C       Electronically Signed By: Luiz Galarza PA-C   6/10/23 10:35 PM

## 2023-06-10 PROBLEM — F41.9 ANXIETY: Status: ACTIVE | Noted: 2023-01-01

## 2023-06-20 NOTE — PROGRESS NOTES
"6/20/2023  Name: Katya Mejia  YOB: 1929    Chief complaint: Follow up for APRIL. Poor appetite.    HPI: Patient seen and examined in her room. She has not been eating or drinking adequately over the last week. Review of lab work show worsening renal function consistent decreased fluid intake. Patient is currently prescribed Lasix 40 mg po daily. Patient denies fever, chills, nausea, abdominal pain , dysphagia, or diarrhea.    Review of systems:   ROS negative except were noted in HPI.    Code Status: DNR-CC    /69   Pulse 72   Temp 36.2 °C (97.1 °F)   Resp 16   Ht 1.549 m (5' 1\")   Wt 130 kg (287 lb)   SpO2 96%   BMI 54.23 kg/m²         Physical Exam  Constitutional:       General: She is not in acute distress.     Appearance: She is obese. She is not toxic-appearing.   HENT:      Head: Normocephalic.      Nose: Nose normal. No congestion.      Mouth/Throat:      Pharynx: Oropharynx is clear.   Eyes:      Extraocular Movements: Extraocular movements intact.      Pupils: Pupils are equal, round, and reactive to light.   Cardiovascular:      Rate and Rhythm: Normal rate. Rhythm irregular.      Pulses: Normal pulses.   Pulmonary:      Effort: Pulmonary effort is normal.      Breath sounds: Normal breath sounds. No wheezing or rales.   Abdominal:      General: There is no distension.      Palpations: Abdomen is soft.      Tenderness: There is no abdominal tenderness. There is no guarding.   Genitourinary:     Comments: Incontinent of urine     Musculoskeletal:      Cervical back: Normal range of motion.      Right lower leg: Edema present.      Left lower leg: Edema present.      Comments: Not walking.   Lymphadenopathy:      Cervical: No cervical adenopathy.   Skin:     General: Skin is warm and dry.      Capillary Refill: Capillary refill takes less than 2 seconds.   Neurological:      General: No focal deficit present.      Mental Status: She is alert. Mental status is at baseline. "   Psychiatric:         Mood and Affect: Mood normal.         Behavior: Behavior normal.    Medications reviewed during visit at facility.    Omeprazole 20 mg po daily  Ammonium lactate 12% cream to legs bid  Myrbetriq 25 mg po daily\  Atorvastatin 20 mg po daily  Colace 100 mg po bid  Metoprolol tartrate 25 mg po bid  Calcium/Vitamin D 600/200 po daily  Basaglar insulin 15 units subcutaneous daily  Nystatin powder 100,000 to abdominal folds q 4 hours prn  Ropinirole 2 mg po q hs.    Loratadine 10 mg po daily  Magnesium oxide 400 mg po daily  Lasix 40 mg po daily  Vitamin C 500 mg po daily  Novolog insulin sliding scale.  Tradjenta 5 mg po daily  Eliquis 2.5 po bid  Zoloft 25 mg po daily  Labs reviewed at facility:    Laboratory: 2023 14:13 BASIC MET PNL INCL GFR (BMP) / CBC W/DIFF  Latest Version (more available)  Reviewed by lara on 2023 14:36  Resident Information  Resident: Katya Mejia ([id number] NF)  Admit Date: 12/3/2020  Admitting Provider:   Attending Provider:   Copy to List:   Report Information  Collection Date: 2023 07:45  Received Date: 2023 08:01  Reported Date: 2023 14:13  Ord. Provider: Luz Garrett  Source Key: [id number]  Lab Information  Status: Completed  Flag:    Reporting Lab:   [facility]  Order #:   [id number]  Vendor Order #:   [id number]  Category:   Chemistry, Hematology, Unknown Category  Order Notes  Result for:  ENOC KATYA  ( 1929, F)         Results   Show All Details Result Units Ref. Range Flag Status   BASIC MET PNL INCL GFR (BMP)       GLUCOSE  151 mg/dL  H Final      GLUCOSE, FASTING         65-99  mg/dL                               GLUCOSE, NON-FASTING      mg/dL       SODIUM  140 mEq/L 136-145  Final           POTASSIUM  4.0 mEq/L 3.5-5.3  Final           CHLORIDE  99 mEq/L   Final           CARBON DIOXIDE (CO2)  28 mEq/L 21-33  Final           BUN (UREA NITROGEN)  42 mg/dL 7-25 H Final            CREATININE  1.8 mg/dL 0.6-1.2 H Final           BUN/CREATININE RATIO  23  6-22 H Final           GFR-AFRICAN AMERICAN  32 mL/min/1.73 m2 >60 L Final           GFR-NON-AFRICAN AMERICAN  26 mL/min/1.73 m2 >60 L Final           Stage of CKD     eGFR (mL/min/1.73 square meters)          Stage 1        >/= 90        or > 90          Stage 2        60 - 89          Stage 3        30 - 59          Stage 4        15 - 29          Stage 5        </= 14        or < 15  ** GFR is reliable for adults 17 to 69 years with stable kidney      function.        CALCIUM  9.2 mg/dL 8.4-10.2  Final       CBC W/DIFF       WBC  9.0 K/cmm 4.5-10.8  Final           RBC  3.75 M/cmm 3.90-5.40 L Final           HEMOGLOBIN  10.5 g/dL 12.0-16.0 L Final           HEMATOCRIT  33.2 % 36.0-48.0 L Final           MCV  88.5 fL 80.0-100.0  Final           MCH  28.0 pg 26.0-35.0  Final           MCHC  31.7 g/dL 31.0-36.5  Final           RDW  17.8 % 11.0-16.0 H Final           PLATELET  274 K/cmm 150-450  Final           MPV  8.2 fL 6.5-12.0  Final           ANISOCYTOSIS  1+  NEGATIVE A Final           NEUTROPHILS  65.9 % 40.0-80.0  Final           LYMPHS  20.9 % 13.0-48.0  Final           MONOCYTES  7.9 % 2.0-12.0  Final           EOS  4.9 % 0.0-8.0  Final           BASO  0.4 % 0.0-2.0  Final           NEUTS (ABSOLUTE)  5.90 K/uL 1.50-7.60  Final           LYMPHS (ABSOLUTE)  1.90 K/uL 0.90-5.50  Final           MONOCYTES (ABSOLUTE)  0.70 K/uL 0.15-1.10  Final           EOS (ABSOLUTE)  0.40 K/uL 0.20-0.80  Final           NUCLEATED RBC  0.1 NRBC/100 WBC <1.0  Final      Assessment/Plan    Problem List Items Addressed This Visit       Anxiety and depression     Zoloft 25 mg po daily.          APRIL (acute kidney injury) (CMS/HCC) - Primary     Hold Lasix x 2 days. Repeat BMP on 6/21/2023. Encourage fluids.          Poor appetite     Discuss with dietician. Provide supplements.             Time:    Luiz Galarza PA-C

## 2023-06-20 NOTE — LETTER
"Patient: Katya Mejia  : 1929    Encounter Date: 2023  Name: Katya Mejia  YOB: 1929    Chief complaint: Follow up for APRIL. Poor appetite.    HPI: Patient seen and examined in her room. She has not been eating or drinking adequately over the last week. Review of lab work show worsening renal function consistent decreased fluid intake. Patient is currently prescribed Lasix 40 mg po daily. Patient denies fever, chills, nausea, abdominal pain , dysphagia, or diarrhea.    Review of systems:   ROS negative except were noted in HPI.    Code Status: DNR-CC    /69   Pulse 72   Temp 36.2 °C (97.1 °F)   Resp 16   Ht 1.549 m (5' 1\")   Wt 130 kg (287 lb)   SpO2 96%   BMI 54.23 kg/m²         Physical Exam  Constitutional:       General: She is not in acute distress.     Appearance: She is obese. She is not toxic-appearing.   HENT:      Head: Normocephalic.      Nose: Nose normal. No congestion.      Mouth/Throat:      Pharynx: Oropharynx is clear.   Eyes:      Extraocular Movements: Extraocular movements intact.      Pupils: Pupils are equal, round, and reactive to light.   Cardiovascular:      Rate and Rhythm: Normal rate. Rhythm irregular.      Pulses: Normal pulses.   Pulmonary:      Effort: Pulmonary effort is normal.      Breath sounds: Normal breath sounds. No wheezing or rales.   Abdominal:      General: There is no distension.      Palpations: Abdomen is soft.      Tenderness: There is no abdominal tenderness. There is no guarding.   Genitourinary:     Comments: Incontinent of urine     Musculoskeletal:      Cervical back: Normal range of motion.      Right lower leg: Edema present.      Left lower leg: Edema present.      Comments: Not walking.   Lymphadenopathy:      Cervical: No cervical adenopathy.   Skin:     General: Skin is warm and dry.      Capillary Refill: Capillary refill takes less than 2 seconds.   Neurological:      General: No focal deficit " present.      Mental Status: She is alert. Mental status is at baseline.   Psychiatric:         Mood and Affect: Mood normal.         Behavior: Behavior normal.    Medications reviewed during visit at facility.    Omeprazole 20 mg po daily  Ammonium lactate 12% cream to legs bid  Myrbetriq 25 mg po daily\  Atorvastatin 20 mg po daily  Colace 100 mg po bid  Metoprolol tartrate 25 mg po bid  Calcium/Vitamin D 600/200 po daily  Basaglar insulin 15 units subcutaneous daily  Nystatin powder 100,000 to abdominal folds q 4 hours prn  Ropinirole 2 mg po q hs.    Loratadine 10 mg po daily  Magnesium oxide 400 mg po daily  Lasix 40 mg po daily  Vitamin C 500 mg po daily  Novolog insulin sliding scale.  Tradjenta 5 mg po daily  Eliquis 2.5 po bid  Zoloft 25 mg po daily  Labs reviewed at facility:    Laboratory: 2023 14:13 BASIC MET PNL INCL GFR (BMP) / CBC W/DIFF  Latest Version (more available)  Reviewed by lara on 2023 14:36  Resident Information  Resident: Katya Mejia ([id number] NF)  Admit Date: 12/3/2020  Admitting Provider:   Attending Provider:   Copy to List:   Report Information  Collection Date: 2023 07:45  Received Date: 2023 08:01  Reported Date: 2023 14:13  Ord. Provider: Luz Garrett  Source Key: [id number]  Lab Information  Status: Completed  Flag:    Reporting Lab:   [facility]  Order #:   [id number]  Vendor Order #:   [id number]  Category:   Chemistry, Hematology, Unknown Category  Order Notes  Result for:  ENOC KATYA  ( 1929, F)         Results   Show All Details Result Units Ref. Range Flag Status   BASIC MET PNL INCL GFR (BMP)       GLUCOSE  151 mg/dL  H Final      GLUCOSE, FASTING         65-99  mg/dL                               GLUCOSE, NON-FASTING      mg/dL       SODIUM  140 mEq/L 136-145  Final           POTASSIUM  4.0 mEq/L 3.5-5.3  Final           CHLORIDE  99 mEq/L   Final           CARBON DIOXIDE (CO2)  28 mEq/L 21-33  Final            BUN (UREA NITROGEN)  42 mg/dL 7-25 H Final           CREATININE  1.8 mg/dL 0.6-1.2 H Final           BUN/CREATININE RATIO  23  6-22 H Final           GFR-AFRICAN AMERICAN  32 mL/min/1.73 m2 >60 L Final           GFR-NON-AFRICAN AMERICAN  26 mL/min/1.73 m2 >60 L Final           Stage of CKD     eGFR (mL/min/1.73 square meters)          Stage 1        >/= 90        or > 90          Stage 2        60 - 89          Stage 3        30 - 59          Stage 4        15 - 29          Stage 5        </= 14        or < 15  ** GFR is reliable for adults 17 to 69 years with stable kidney      function.        CALCIUM  9.2 mg/dL 8.4-10.2  Final       CBC W/DIFF       WBC  9.0 K/cmm 4.5-10.8  Final           RBC  3.75 M/cmm 3.90-5.40 L Final           HEMOGLOBIN  10.5 g/dL 12.0-16.0 L Final           HEMATOCRIT  33.2 % 36.0-48.0 L Final           MCV  88.5 fL 80.0-100.0  Final           MCH  28.0 pg 26.0-35.0  Final           MCHC  31.7 g/dL 31.0-36.5  Final           RDW  17.8 % 11.0-16.0 H Final           PLATELET  274 K/cmm 150-450  Final           MPV  8.2 fL 6.5-12.0  Final           ANISOCYTOSIS  1+  NEGATIVE A Final           NEUTROPHILS  65.9 % 40.0-80.0  Final           LYMPHS  20.9 % 13.0-48.0  Final           MONOCYTES  7.9 % 2.0-12.0  Final           EOS  4.9 % 0.0-8.0  Final           BASO  0.4 % 0.0-2.0  Final           NEUTS (ABSOLUTE)  5.90 K/uL 1.50-7.60  Final           LYMPHS (ABSOLUTE)  1.90 K/uL 0.90-5.50  Final           MONOCYTES (ABSOLUTE)  0.70 K/uL 0.15-1.10  Final           EOS (ABSOLUTE)  0.40 K/uL 0.20-0.80  Final           NUCLEATED RBC  0.1 NRBC/100 WBC <1.0  Final      Assessment/Plan   Problem List Items Addressed This Visit       Anxiety and depression     Zoloft 25 mg po daily.          APRIL (acute kidney injury) (CMS/HCC) - Primary     Hold Lasix x 2 days. Repeat BMP on 6/21/2023. Encourage fluids.          Poor appetite     Discuss with dietician. Provide supplements.             Time:    Luiz  ALEX Galarza PA-C       Electronically Signed By: Luiz Galarza PA-C   6/21/23 10:55 AM

## 2023-06-21 PROBLEM — F32.A ANXIETY AND DEPRESSION: Status: ACTIVE | Noted: 2023-01-01

## 2023-06-21 PROBLEM — R63.0 POOR APPETITE: Status: ACTIVE | Noted: 2023-01-01

## 2023-06-21 PROBLEM — N17.9 AKI (ACUTE KIDNEY INJURY) (CMS-HCC): Status: ACTIVE | Noted: 2023-01-01

## 2023-06-22 NOTE — LETTER
"Patient: Katya Mejia  : 1929    Encounter Date: 2023  Name: Katya Mejia  YOB: 1929    Chief complaint: Follow up for APRIL. Poor po intake.    HPI: Patient seen and examined in her room. Her niece Ethel is present. Patient is more alert after receiving IVF last night. Her niece has been able to get patient to eat some breakfast this morning. Lasix has been held. Repeat BMP ordered for tomorrow. Patient is calm and cooperative with exam. She is dependent on nursing staff for repositioning, transfers, and toileting. Requires [place] lift for transfers. Denies fever, chills, sob, chest pain, palpitation, nausea, or dysuria.    Review of systems:   ROS negative except were noted in HPI.    Code Status: DNR-CCA    /58   Pulse 86   Temp 36.4 °C (97.5 °F)   Resp 16   Ht 1.549 m (5' 1\")   Wt 130 kg (287 lb 9.6 oz)   SpO2 96%   BMI 54.34 kg/m²      Physical Exam  Constitutional:       General: She is not in acute distress.     Appearance: She is obese. She is not toxic-appearing.   HENT:      Head: Normocephalic.      Nose: Nose normal. No congestion.      Mouth/Throat:      Pharynx: Oropharynx is clear.   Eyes:      Extraocular Movements: Extraocular movements intact.      Pupils: Pupils are equal, round, and reactive to light.   Cardiovascular:      Rate and Rhythm: Normal rate. Rhythm irregular.      Pulses: Normal pulses.   Pulmonary:      Effort: Pulmonary effort is normal.      Breath sounds: Normal breath sounds. No wheezing or rales.   Abdominal:      General: There is no distension.      Palpations: Abdomen is soft.      Tenderness: There is no abdominal tenderness. There is no guarding.   Genitourinary:     Comments: Incontinent of urine     Musculoskeletal:      Cervical back: Normal range of motion.     Chronic venous statsis both lower legs.     Comments: Not walking.   Lymphadenopathy:      Cervical: No cervical adenopathy.   Skin:     General: Skin " is warm and dry.      Capillary Refill: Capillary refill takes less than 2 seconds.   Neurological:      General: No focal deficit present.      Mental Status: She is alert. Mental status is at baseline.   Psychiatric:         Mood and Affect: Mood normal.         Behavior: Behavior normal.     Medications reviewed during visit at facility.  Omeprazole 20 mg po daily  Ammonium lactate 12% cream to legs bid  Myrbetriq 25 mg po daily\  Atorvastatin 20 mg po daily  Colace 100 mg po bid  Metoprolol tartrate 25 mg po bid  Calcium/Vitamin D 600/200 po daily  Basaglar insulin 15 units subcutaneous daily  Nystatin powder 100,000 to abdominal folds q 4 hours prn  Ropinirole 2 mg po q hs.    Loratadine 10 mg po daily  Magnesium oxide 400 mg po daily  Vitamin C 500 mg po daily  Novolog insulin sliding scale.  Tradjenta 5 mg po daily  Eliquis 2.5 po bid  Zoloft 50 mg po daily  Labs reviewed at facility:    Assessment/Plan   Problem List Items Addressed This Visit       Atrial fibrillation (CMS/[facility])     Controlled rate on beta blocker. Continue Metoprolol 25 mg po bid.          Impaired functional mobility, balance, gait, and endurance     PT and OT to assess and treat. Discuss progress with physical therapy. Not walking. Requires    [place] lift for transfers.         Anxiety and depression     Increased Zoloft to 50 mg po daily. Consult Dr. De Jesus.         APRIL (acute kidney injury) (CMS/[facility]) - Primary     Hold Lasix . Repeat BMP on 6/23/2023. Encourage fluids.              Time:    Luiz Galarza PA-C       Electronically Signed By: Luiz Galarza PA-C   6/23/23  9:47 AM

## 2023-06-22 NOTE — PROGRESS NOTES
"6/22/2023  Name: Katya Mejia  YOB: 1929    Chief complaint: Follow up for APRIL. Poor po intake.    HPI: Patient seen and examined in her room. Her niece Ethel is present. Patient is more alert after receiving IVF last night. Her niece has been able to get patient to eat some breakfast this morning. Lasix has been held. Repeat BMP ordered for tomorrow. Patient is calm and cooperative with exam. She is dependent on nursing staff for repositioning, transfers, and toileting. Requires [place] lift for transfers. Denies fever, chills, sob, chest pain, palpitation, nausea, or dysuria.    Review of systems:   ROS negative except were noted in HPI.    Code Status: DNR-CCA    /58   Pulse 86   Temp 36.4 °C (97.5 °F)   Resp 16   Ht 1.549 m (5' 1\")   Wt 130 kg (287 lb 9.6 oz)   SpO2 96%   BMI 54.34 kg/m²      Physical Exam  Constitutional:       General: She is not in acute distress.     Appearance: She is obese. She is not toxic-appearing.   HENT:      Head: Normocephalic.      Nose: Nose normal. No congestion.      Mouth/Throat:      Pharynx: Oropharynx is clear.   Eyes:      Extraocular Movements: Extraocular movements intact.      Pupils: Pupils are equal, round, and reactive to light.   Cardiovascular:      Rate and Rhythm: Normal rate. Rhythm irregular.      Pulses: Normal pulses.   Pulmonary:      Effort: Pulmonary effort is normal.      Breath sounds: Normal breath sounds. No wheezing or rales.   Abdominal:      General: There is no distension.      Palpations: Abdomen is soft.      Tenderness: There is no abdominal tenderness. There is no guarding.   Genitourinary:     Comments: Incontinent of urine     Musculoskeletal:      Cervical back: Normal range of motion.     Chronic venous statsis both lower legs.     Comments: Not walking.   Lymphadenopathy:      Cervical: No cervical adenopathy.   Skin:     General: Skin is warm and dry.      Capillary Refill: Capillary refill takes less than 2 " seconds.   Neurological:      General: No focal deficit present.      Mental Status: She is alert. Mental status is at baseline.   Psychiatric:         Mood and Affect: Mood normal.         Behavior: Behavior normal.     Medications reviewed during visit at facility.  Omeprazole 20 mg po daily  Ammonium lactate 12% cream to legs bid  Myrbetriq 25 mg po daily\  Atorvastatin 20 mg po daily  Colace 100 mg po bid  Metoprolol tartrate 25 mg po bid  Calcium/Vitamin D 600/200 po daily  Basaglar insulin 15 units subcutaneous daily  Nystatin powder 100,000 to abdominal folds q 4 hours prn  Ropinirole 2 mg po q hs.    Loratadine 10 mg po daily  Magnesium oxide 400 mg po daily  Vitamin C 500 mg po daily  Novolog insulin sliding scale.  Tradjenta 5 mg po daily  Eliquis 2.5 po bid  Zoloft 50 mg po daily  Labs reviewed at facility:    Assessment/Plan    Problem List Items Addressed This Visit       Atrial fibrillation (CMS/[facility])     Controlled rate on beta blocker. Continue Metoprolol 25 mg po bid.          Impaired functional mobility, balance, gait, and endurance     PT and OT to assess and treat. Discuss progress with physical therapy. Not walking. Requires    [place] lift for transfers.         Anxiety and depression     Increased Zoloft to 50 mg po daily. Consult Dr. De Jesus.         APRIL (acute kidney injury) (CMS/[facility]) - Primary     Hold Lasix . Repeat BMP on 6/23/2023. Encourage fluids.              Time:    Luiz Galarza PA-C

## 2023-06-23 NOTE — ASSESSMENT & PLAN NOTE
PT and OT to assess and treat. Discuss progress with physical therapy. Not walking. Requires    [place] lift for transfers.